# Patient Record
Sex: FEMALE | NOT HISPANIC OR LATINO | URBAN - METROPOLITAN AREA
[De-identification: names, ages, dates, MRNs, and addresses within clinical notes are randomized per-mention and may not be internally consistent; named-entity substitution may affect disease eponyms.]

---

## 2024-03-15 ENCOUNTER — APPOINTMENT (RX ONLY)
Dept: URBAN - METROPOLITAN AREA CLINIC 141 | Facility: CLINIC | Age: 59
Setting detail: DERMATOLOGY
End: 2024-03-15

## 2024-03-15 DIAGNOSIS — L0390 CELLULITIS AND ABSCESS OF UNSPECIFIED SITES: ICD-10-CM

## 2024-03-15 DIAGNOSIS — L0391 CELLULITIS AND ABSCESS OF UNSPECIFIED SITES: ICD-10-CM

## 2024-03-15 PROBLEM — L02.413 CUTANEOUS ABSCESS OF RIGHT UPPER LIMB: Status: ACTIVE | Noted: 2024-03-15

## 2024-03-15 PROCEDURE — ? OTHER (SELF PAY)

## 2024-03-15 PROCEDURE — ? INTRALESIONAL KENALOG

## 2024-03-15 PROCEDURE — ? SEPARATE AND IDENTIFIABLE DOCUMENTATION

## 2024-03-15 PROCEDURE — ? ORDER TESTS

## 2024-03-15 PROCEDURE — ? INCISION AND DRAINAGE

## 2024-03-15 PROCEDURE — ? COUNSELING

## 2024-03-15 PROCEDURE — 99202 OFFICE O/P NEW SF 15 MIN: CPT | Mod: 25

## 2024-03-15 PROCEDURE — 11900 INJECT SKIN LESIONS </W 7: CPT

## 2024-03-15 PROCEDURE — 10060 I&D ABSCESS SIMPLE/SINGLE: CPT

## 2024-03-15 ASSESSMENT — LOCATION ZONE DERM: LOCATION ZONE: ARM

## 2024-03-15 ASSESSMENT — LOCATION SIMPLE DESCRIPTION DERM: LOCATION SIMPLE: RIGHT SHOULDER

## 2024-03-15 ASSESSMENT — LOCATION DETAILED DESCRIPTION DERM: LOCATION DETAILED: RIGHT POSTERIOR SHOULDER

## 2024-03-15 NOTE — PROCEDURE: ORDER TESTS
Lab Facility: [id number]
Bill For Surgical Tray: no
Performing Laboratory: [id number]
Expected Date Of Service: 03/15/2024
Billing Type: Client Bill
Clinical Notes (To The Lab): Bill client patient self pay

## 2024-03-15 NOTE — PROCEDURE: INTRALESIONAL KENALOG
Consent: The risks of atrophy were reviewed with the patient.
Which Kenalog Vial Was Used?: Kenalog 10 mg/ml (5 ml vial)
X Size Of Lesion In Cm (Optional): 0
Total Volume (Ccs): .7
Include Z78.9 (Other Specified Conditions Influencing Health Status) As An Associated Diagnosis?: No
Medical Necessity Clause: This procedure was medically necessary because the lesions that were treated were:
Concentration Of Kenalog Solution Injected (Mg/Ml): 5.0
Detail Level: Simple
Kenalog Preparation: Kenalog
Kenalog Type Of Vial: Multiple Dose
Validate Note Data When Using Inventory: Yes
Show Inventory Tab: Show

## 2024-03-22 ENCOUNTER — APPOINTMENT (RX ONLY)
Dept: URBAN - METROPOLITAN AREA CLINIC 141 | Facility: CLINIC | Age: 59
Setting detail: DERMATOLOGY
End: 2024-03-22

## 2024-03-22 DIAGNOSIS — L0390 CELLULITIS AND ABSCESS OF UNSPECIFIED SITES: ICD-10-CM | Status: IMPROVED

## 2024-03-22 DIAGNOSIS — L0391 CELLULITIS AND ABSCESS OF UNSPECIFIED SITES: ICD-10-CM | Status: IMPROVED

## 2024-03-22 PROBLEM — L02.413 CUTANEOUS ABSCESS OF RIGHT UPPER LIMB: Status: ACTIVE | Noted: 2024-03-22

## 2024-03-22 PROCEDURE — 11900 INJECT SKIN LESIONS </W 7: CPT | Mod: NC

## 2024-03-22 PROCEDURE — ? PRESCRIPTION MEDICATION MANAGEMENT

## 2024-03-22 PROCEDURE — ? INTRALESIONAL KENALOG

## 2024-03-22 PROCEDURE — ? ADDITIONAL NOTES

## 2024-03-22 PROCEDURE — ? COUNSELING

## 2024-03-22 ASSESSMENT — LOCATION ZONE DERM: LOCATION ZONE: ARM

## 2024-03-22 ASSESSMENT — LOCATION DETAILED DESCRIPTION DERM: LOCATION DETAILED: RIGHT ANTERIOR SHOULDER

## 2024-03-22 ASSESSMENT — LOCATION SIMPLE DESCRIPTION DERM: LOCATION SIMPLE: RIGHT SHOULDER

## 2024-03-22 NOTE — PROCEDURE: INTRALESIONAL KENALOG
Show Inventory Tab: Show
Kenalog Type Of Vial: Single Dose
How Many Mls Were Removed From The 80 Mg/Ml (5ml) Vial When Preparing The Injectable Solution?: 0
Which Kenalog Vial Was Used?: Kenalog 10 mg/ml (5 ml vial)
Consent: The risks of atrophy were reviewed with the patient.
Bill For Wasted Drug (Kenalog)?: no
Treatment Number (Optional): 2
Concentration Of Kenalog Solution Injected (Mg/Ml): 5.0
How Many Mls Were Removed From The 10 Mg/Ml (5ml) Vial When Preparing The Injectable Solution?: 1
Kenalog Preparation: Kenalog
Detail Level: Simple
Administered By (Optional): Jarrett STEELE
Validate Note Data When Using Inventory: Yes
Total Volume (Ccs): .5
Medical Necessity Clause: This procedure was medically necessary because the lesions that were treated were:

## 2024-03-22 NOTE — PROCEDURE: ADDITIONAL NOTES
Additional Notes: 3/22/24 patient is present today for follow up. Had Kenalog injection on 3/15/24 and I&D on 3/15/24. Culture done on 3/15/24 and it shows no growth. States she feels area is slightly improved. Patient was on oral antibiotics prior to us and has been using mupirocin to area. States there is no drainage on area. Patient to continue with mupirocin and a bandaid. States this lesion has been present for over a month.
Detail Level: Simple
Render Risk Assessment In Note?: no
Additional Notes: Lot: [id number]\\nExp: 12/2025\\nNDC: 003-0494-20

## 2024-03-29 ENCOUNTER — APPOINTMENT (RX ONLY)
Dept: URBAN - METROPOLITAN AREA CLINIC 141 | Facility: CLINIC | Age: 59
Setting detail: DERMATOLOGY
End: 2024-03-29

## 2024-03-29 DIAGNOSIS — L0391 CELLULITIS AND ABSCESS OF UNSPECIFIED SITES: ICD-10-CM

## 2024-03-29 DIAGNOSIS — L0390 CELLULITIS AND ABSCESS OF UNSPECIFIED SITES: ICD-10-CM

## 2024-03-29 PROBLEM — L02.413 CUTANEOUS ABSCESS OF RIGHT UPPER LIMB: Status: ACTIVE | Noted: 2024-03-29

## 2024-03-29 PROCEDURE — ? PRESCRIPTION

## 2024-03-29 PROCEDURE — ? INTRALESIONAL KENALOG

## 2024-03-29 PROCEDURE — ? COUNSELING

## 2024-03-29 PROCEDURE — ? PRESCRIPTION MEDICATION MANAGEMENT

## 2024-03-29 PROCEDURE — 11900 INJECT SKIN LESIONS </W 7: CPT | Mod: NC

## 2024-03-29 PROCEDURE — ? ADDITIONAL NOTES

## 2024-03-29 RX ORDER — MUPIROCIN 20 MG/G
OINTMENT TOPICAL
Qty: 22 | Refills: 2 | Status: ERX | COMMUNITY
Start: 2024-03-29

## 2024-03-29 RX ADMIN — MUPIROCIN: 20 OINTMENT TOPICAL at 00:00

## 2024-03-29 ASSESSMENT — LOCATION SIMPLE DESCRIPTION DERM: LOCATION SIMPLE: RIGHT SHOULDER

## 2024-03-29 ASSESSMENT — LOCATION DETAILED DESCRIPTION DERM: LOCATION DETAILED: RIGHT ANTERIOR SHOULDER

## 2024-03-29 ASSESSMENT — LOCATION ZONE DERM: LOCATION ZONE: ARM

## 2024-03-29 NOTE — PROCEDURE: PRESCRIPTION MEDICATION MANAGEMENT
Continue Regimen: Mupirocin 2% oint
Detail Level: Zone
Render In Strict Bullet Format?: No
1 Hour(s) 25 Minute(s)

## 2024-03-29 NOTE — PROCEDURE: ADDITIONAL NOTES
Additional Notes: 3/22/24 patient is present today for follow up. Had Kenalog injection on 3/15/24 and I&D on 3/15/24. Culture done on 3/15/24 and it shows no growth. States she feels area is slightly improved. Patient was on oral antibiotics prior to us and has been using mupirocin to area. States there is no drainage on area. Patient to continue with mupirocin and a bandaid. States this lesion has been present for over a month.
Detail Level: Simple
Render Risk Assessment In Note?: no
Additional Notes: Lot: [id number]\\nExp: 12/2025\\nNDC: 003-0494-20
Additional Notes: 3/29/24- provider debulked abscess with curette and injected with Kenalog.

## 2024-03-29 NOTE — PROCEDURE: INTRALESIONAL KENALOG
Show Inventory Tab: Show
Kenalog Type Of Vial: Single Dose
How Many Mls Were Removed From The 80 Mg/Ml (5ml) Vial When Preparing The Injectable Solution?: 0
Which Kenalog Vial Was Used?: Kenalog 10 mg/ml (5 ml vial)
Consent: The risks of atrophy were reviewed with the patient.
Bill For Wasted Drug (Kenalog)?: no
Treatment Number (Optional): 3
Concentration Of Kenalog Solution Injected (Mg/Ml): 5.0
How Many Mls Were Removed From The 10 Mg/Ml (5ml) Vial When Preparing The Injectable Solution?: 1
Kenalog Preparation: Kenalog
Detail Level: Simple
Administered By (Optional): Jarrett STEELE
Validate Note Data When Using Inventory: Yes
Total Volume (Ccs): .5
Medical Necessity Clause: This procedure was medically necessary because the lesions that were treated were:

## 2024-04-05 ENCOUNTER — APPOINTMENT (RX ONLY)
Dept: URBAN - METROPOLITAN AREA CLINIC 141 | Facility: CLINIC | Age: 59
Setting detail: DERMATOLOGY
End: 2024-04-05

## 2024-04-05 DIAGNOSIS — L0390 CELLULITIS AND ABSCESS OF UNSPECIFIED SITES: ICD-10-CM | Status: IMPROVED

## 2024-04-05 DIAGNOSIS — L0391 CELLULITIS AND ABSCESS OF UNSPECIFIED SITES: ICD-10-CM | Status: IMPROVED

## 2024-04-05 PROBLEM — L02.413 CUTANEOUS ABSCESS OF RIGHT UPPER LIMB: Status: ACTIVE | Noted: 2024-04-05

## 2024-04-05 PROCEDURE — ? PRESCRIPTION MEDICATION MANAGEMENT

## 2024-04-05 PROCEDURE — ? ADDITIONAL NOTES

## 2024-04-05 PROCEDURE — ? INTRALESIONAL KENALOG

## 2024-04-05 PROCEDURE — 11900 INJECT SKIN LESIONS </W 7: CPT | Mod: NC

## 2024-04-05 PROCEDURE — ? PRESCRIPTION

## 2024-04-05 PROCEDURE — ? COUNSELING

## 2024-04-05 RX ORDER — MUPIROCIN 20 MG/G
OINTMENT TOPICAL
Qty: 22 | Refills: 2 | Status: ERX

## 2024-04-05 ASSESSMENT — LOCATION ZONE DERM: LOCATION ZONE: ARM

## 2024-04-05 ASSESSMENT — LOCATION DETAILED DESCRIPTION DERM: LOCATION DETAILED: RIGHT ANTERIOR SHOULDER

## 2024-04-05 ASSESSMENT — LOCATION SIMPLE DESCRIPTION DERM: LOCATION SIMPLE: RIGHT SHOULDER

## 2024-04-05 NOTE — PROCEDURE: INTRALESIONAL KENALOG
Show Inventory Tab: Show
Kenalog Type Of Vial: Single Dose
How Many Mls Were Removed From The 80 Mg/Ml (5ml) Vial When Preparing The Injectable Solution?: 0
Which Kenalog Vial Was Used?: Kenalog 10 mg/ml (5 ml vial)
Consent: The risks of atrophy were reviewed with the patient.
Bill For Wasted Drug (Kenalog)?: no
Treatment Number (Optional): 4
Concentration Of Kenalog Solution Injected (Mg/Ml): 5.0
How Many Mls Were Removed From The 10 Mg/Ml (5ml) Vial When Preparing The Injectable Solution?: 1
Kenalog Preparation: Kenalog
Detail Level: Simple
Administered By (Optional): Jarrett STEELE
Validate Note Data When Using Inventory: Yes
Total Volume (Ccs): .7
Medical Necessity Clause: This procedure was medically necessary because the lesions that were treated were:

## 2024-04-05 NOTE — PROCEDURE: ADDITIONAL NOTES
Additional Notes: 4/5/24 patient present today for follow up. 4/5/24 area on right shoulder has 1 cm center ulceration \\nHad Kenalog injection on 3/24/24, 3/15/24 and I&D on 3/15/24. Area was debulked with currette 3/24/24 .Culture done on 3/15/24 and it shows no growth. Pt states area is improving . Patient was on oral antibiotics prior to us and has been using mupirocin to area. Patient to continue with mupirocin and a bandaid. Patient received Kenalog injection at today’s OV 4/5/24. Will follow up in 2 weeks
Detail Level: Simple
Render Risk Assessment In Note?: no
Additional Notes: Lot: [id number]\\nExp: 12/2025\\nNDC: [id number]
Additional Notes: No charge per provider Jovon STEELE